# Patient Record
Sex: FEMALE | Race: BLACK OR AFRICAN AMERICAN | Employment: OTHER | URBAN - METROPOLITAN AREA
[De-identification: names, ages, dates, MRNs, and addresses within clinical notes are randomized per-mention and may not be internally consistent; named-entity substitution may affect disease eponyms.]

---

## 2020-08-12 ENCOUNTER — HOSPITAL ENCOUNTER (OUTPATIENT)
Dept: NUTRITION | Age: 65
Discharge: HOME | End: 2020-08-12
Payer: MEDICARE

## 2020-08-12 PROCEDURE — 97802 MEDICAL NUTRITION INDIV IN: CPT

## 2020-08-12 NOTE — PROGRESS NOTES
[address] [place] [address], [facility]  Phone: [phone] Fax: [phone]   Nutrition Assessment - Medical Nutrition Therapy   Outpatient Initial Evaluation         Patient Name: Carmita David : [id number]   Treatment Diagnosis: DM2   Referral Source: Jana Ivey MD [facility]): 2020     Gender: female Age: 71 y.o. Ht: 64 in Wt:  201 lb  kg   BMI: 35.4 BMR   Male  BMR Female [id number]     Past Medical History:  DM2, obesity, high BP, HLD     Pertinent Medications:   Metformin, Novolog, Levemir, BP and cholesterol meds (see paper chart)   Biochemical Data:   A1C 2020: 9.6     Assessment:    Pt in today with concerns over elevated A1C. In Feb of this year, her A1C was 6.5 and now it has gone up to 9.6. She has seen an RD in the past and states that she does remember her education. Pt does not work currently. Pt does the cooking for the household and she eats most meals at home. Pt tests her BS at home but she is currently working on getting a new machine. She walks 7 days per week most weeks for exercise. Food & Nutrition: Pt states that she eats a lot of junk food like potato chips and sweets. She has also been eating a lot of fried foods. She will drink 1 soda per week but she does drink other sweetened beverages such as tea and juice. No alcohol intake. B: cereal or yogurt with banana or other fruit or a muffin  L: Tuna or deli meat sandwich  D: chicken or liver with rice or potatoes and green beans or cabbage  Howard/snack after dinner: chips or something sweet     Estimated Needs   Kcals/ 1600  Protein: 100 Carbs: 180 Fat: 53   day  g/day  g/day  g/day        percent: 25  45  30               Nutrition Diagnosis PES: Alt nutrition related lab values r/t dx of DM2 aeb A1C of 9.6 (2020).         Nutrition Intervention &  Education: Discussed the Healthy Plate Method and appropriate portion sizes of different food groups. Explained the importance of combining carbohydrates with protein at meals and reviewed foods that contain each nutrient. Emphasized the importance of consistent meal time intervals throughout the day to improve metabolism. Explained calorie density and empty calories to assist pt with understanding portion sizes and limiting excess calories. Educated pt on the pathophysiology of Type II Diabetes and insulin resistance and the rationale for dietary modification and increased activity. Educated pt on all carbohydrates found in foods and encouraged no more than 45-60 g each time she eats. Encouraged pt not to go longer than 5 hours without eating but to space meals/snacks at least 3 hours apart. Handouts Provided: [x]  Carbohydrates  [x]  Protein  [x]  Non-starchy Vegatbles  [x]  Food Label  [x]  Meal and Snack Ideas  []  Food Journals [x]  Diabetes  []  Cholesterol  []  Sodium  []  Gen Nutr Guidelines  []  SBGM Guidelines  [x]  Others: snacks   Information Reviewed with: Pt   Readiness to Change Stage: []  Pre-contemplative    []  Contemplative  [x]  Preparation               []  Action                  []  Maintenance   Potential Barriers to Learning: []  Decline in memory    []  Language barrier   []  Other:  []  Emotional                  []  Limited mobility  []  Lack of motivation     [] Vision, hearing or cognitive impairment   Expected Compliance: Fair     Nutritional Goal - To promote lifestyle changes to result in:    []  Weight loss  [x]  Improved diabetic control  []  Decreased cholesterol levels  []  Decreased blood pressure  []  Weight maintenance []  Preventing any interactions associated with food allergies  []  Adequate weight gain toward goal weight  []  Other:        Patient Goals:   1. Limit CHO's to 45-60 g each time you eat  2. Have at least 1 serving of protein each time you eat (balanced meals)  3.  Aim to have a meal or snack within 1-2 hrs of waking - repeat every 3-5 hrs (eat 3-4 times/day)     Dietitian Signature: Roberto Alfredo MS, RD Date: 8/12/2020   Follow-up: 10/6/20 @ 11 Time: 2:15 PM

## 2020-10-06 ENCOUNTER — HOSPITAL ENCOUNTER (OUTPATIENT)
Dept: NUTRITION | Age: 65
Discharge: HOME | End: 2020-10-06
Payer: MEDICARE

## 2020-10-06 PROCEDURE — 97803 MED NUTRITION INDIV SUBSEQ: CPT

## 2020-10-06 NOTE — PROGRESS NOTES
.. NUTRITION - FOLLOW-UP TREATMENT NOTE  Patient Name: Kiersten Lew         Date: 10/6/2020  : 1955    YES/NO Patient  Verified  Diagnosis: Type 2 DM   In time:   11:00          Out time:  11:30   Total Treatment Time (min):   30 minutes     SUBJECTIVE/ASSESSMENT    Changes in medication or medical history? Any new allergies, surgeries or procedures? NO    If yes, update Summary List   The patient is here for a follow up visit. She is returning to her doctor in November and still does not have a glucometer. She is not checking her blood sugars, consequently, but she has made some changes to her diet since her last visit with the RD. She is also walking 5-6 days per week for 30 minutes. Current Wt: 197 Previous Wt: 201 Wt Change: - 4#     Achievement of Goals: 1. Limit CHO to 45-60 g each time you eat- in progress. Not met. I don't believe the patient is counting CHOs  2. Have at least 1 serving of protein each time you eat- balanced meals. Not met (The pt is not eating protein in the morning; she eats sugary cereals)  3. Aim to have meal and snack within 1-2 hrs of waking- eat every 3-5 hours- in progress      Patient Education:  [x]  Review current plan with patient   [x]  Other: Discussed healthy snacks to include 1 carb and 1 protein choice (keep junk food out of the house); discussed alternatives to sugary beverages. Add protein to breakfast meal and eliminate sugary cereals. Handouts/  Information Provided: [x]  Carbohydrates  []  Protein  []  Fiber  []  Serving Sizes  []  Fluids  [x]  General guidelines [x]  Diabetes  []  Cholesterol  []  Sodium  []  SBGM  []  Food Journals  [x]  Others: snack list     New Patient Goals: 1. No more than 2 servings of fruit per day (watch the serving sizes)  2. Cut out the sugary cereals in the morning  3. Continue with 8, 8 oz glasses of water per day.  Continue to work on eliminating all sugary beverages from diet (The patient says she only drinks 2 Pepsi's per week now; she used to drink them daily. She is still drinking Gatorade.)  4. Only add a snack if going longer than 4-5 hours between meals (snack should include 1 pro + 1 carb choice) Don't keep junk food in the house. PLAN    [x]  Continue on current plan []  Follow-up PRN   []  Discharge due to :    [x]  Next appt:  Will call for follow up in December      Dietitian: Anna Garcia RD, [facility]    Date: 10/6/2020 Time: 11:35 AM

## 2020-11-10 ENCOUNTER — HOSPITAL ENCOUNTER (OUTPATIENT)
Dept: NUTRITION | Age: 65
Discharge: HOME | End: 2020-11-10
Payer: MEDICARE

## 2020-11-10 PROCEDURE — 97803 MED NUTRITION INDIV SUBSEQ: CPT

## 2020-11-10 NOTE — PROGRESS NOTES
.. NUTRITION - FOLLOW-UP TREATMENT NOTE  Patient Name: Sly Benoit         Date: 11/10/2020  : 1955    YES/NO Patient  Verified  Diagnosis: Type 2 DM   In time:   11:00       Out time:   11:30   Total Treatment Time (min):  30 minutes     SUBJECTIVE/ASSESSMENT    Changes in medication or medical history? Any new allergies, surgeries or procedures? YES  If yes, update Summary List   The patient states, \"I've been doing pretty good. \" The patient states that she felt like she had a low blood sugar yesterday but did not check her blood sugar because she does not have a glucometer. She said she treated it by sucking on a piece of hard candy. She said she was running around yesterday and didn't eat while she was out. She is taking insulin: 38 units of insulin in the AM and 38 units of insulin in the PM. The patient states she takes Levemir. She did not mention meal time insulin. The patient says she is no longer eating sugary cereals in the morning. She is still eating fruit for snacks. She did not tell me how much fruit she is eating during the day. She says she is drinking more water (did not tell me how much). She is also walking 6-7 days per week. Current Wt: 197.8 Previous Wt: 197 Wt Change: unchanged     Achievement of Goals: 1. No more than 2 servings of fruit per day- in progress  2. Cut out sugary cereals in the morning- met, in progress  3. Continue with 8, 8 oz glasses of water per day (elimnate sugary drinks)- in progress  4. Add a snack if going longer than 4-5 hours without eating- not met, in progress       Patient Education:  [x]  Review current plan with patient   [x]  Other: Discussed what is a low blood sugar, signs and symptoms and how to treat. Encouraged the patient to get a medical ID if walking alone.    Handouts/  Information Provided: [x]  Carbohydrates  []  Protein  []  Fiber  []  Serving Sizes  []  Fluids  [x]  General guidelines [x]  Diabetes  []  Cholesterol  [] Sodium  []  SBGM  []  Food Journals  [x]  Others: hypoglycemia management guidelines (Sukh Del Valle), Medical ID handout      New Patient Goals: 1. If you have fruit as a snack, have some protein with it. Only consume 2 servings of fruit per day max. 2. Walk 30 minutes per day, at least 5 days per week  3. Follow the plate method for meals  4.  Eliminate all sugary drinks for diet and consume 64 oz sugar free beverages each day     PLAN    [x]  Continue on current plan []  Follow-up PRN   []  Discharge due to :    [x]  Next appt: December 10, 2020 @ 11 AM     Dietitian: Johanne Smith RD, [facility]    Date: 11/10/2020 Time: 1:28 PM

## 2020-12-10 ENCOUNTER — HOSPITAL ENCOUNTER (OUTPATIENT)
Dept: NUTRITION | Age: 65
Discharge: HOME | End: 2020-12-10
Payer: MEDICARE

## 2020-12-10 PROCEDURE — 97803 MED NUTRITION INDIV SUBSEQ: CPT

## 2020-12-10 NOTE — PROGRESS NOTES
.. NUTRITION - FOLLOW-UP TREATMENT NOTE  Patient Name: Jeri Marmolejo         Date: 12/10/2020  : 1955    YES/NO Patient  Verified  Diagnosis: type 2 DM   In time:   11:00           Out time:  11:30   Total Treatment Time (min):  30 minutes     SUBJECTIVE/ASSESSMENT    Changes in medication or medical history? Any new allergies, surgeries or procedures? YES   If yes, update Summary List   The patient is here today for a follow up visit. She has done extremely well following RD's advice and continues to lose weight at a steady rate. Diet history reveals that the patient is following the plate method guidelines most of the time. She admits to still be tempted by sweet drinks; she said she had a sweet tea yesterday. She also likes fruit punch. She is aware that she needs to avoid sweetened drinks to avoid blood sugar spikes. Since her last visit with REINALDO, she says she is no longer having hypoglycemia incidents. She continues to walk for at least 30 minutes, 5 days per week. The patient walks with a cane. Breakfast: 1/2 muffin, yogurt and fruit (apple or calros or grapes), no coffee, no juice  Lunch: peanut butter and honey sandwich OR tuna fish salad and crackers  Dinner: baked chicken, potatoes or rice, green beans or cabbage  Beverages: mostly water (she says she is drinking 6-8 cups per day)        Current Wt: 195# Previous Wt: 198# Wt Change: -3#     Achievement of Goals: 1. If you have fruit, have protein with it. Only consume 2 servings of whole fruit per day- Achieved, continue  2. Walk for 30 minutes per day, at least 5 times per week. Achieved, continue  3. Follow the plate method for meals- Achieved, continue  4. Eliminate all sugary drinks and consume 64 oz sugar free beverages each day. Not met but doing better, continue       Patient Education:  [x]  Review current plan with patient   []  Other: Reviewed the sugary beverages that the patient should avoid.  Made suggestion to switch to E[employer]i All Fruit spread for peanut butter sandwich instead of using honey   Handouts/  Information Provided: []  Carbohydrates  []  Protein  []  Fiber  []  Serving Sizes  []  Fluids  []  General guidelines []  Diabetes  []  Cholesterol  []  Sodium  []  SBGM  []  Food Journals  [x]  Others: Holiday Eating     New Patient Goals: Continue with previous goals set as the strategies presented are working! The patient will try to avoid drinking sugary beverages 100% of the time, and drink sugar free beverages instead. The patient will continue to follow plate method. The patient will continue to exercise for 30 minutes on most days.      PLAN    [x]  Continue on current plan []  Follow-up PRN   []  Discharge due to :    [x]  Next appt: January 21, 2021 @11 AM     Dietitian: Ronda Capps RD, [facility]    Date: 12/10/2020 Time: 1:16 PM

## 2021-01-21 ENCOUNTER — HOSPITAL ENCOUNTER (OUTPATIENT)
Dept: NUTRITION | Age: 66
Discharge: HOME | End: 2021-01-21
Payer: MEDICARE

## 2021-01-21 PROCEDURE — 97803 MED NUTRITION INDIV SUBSEQ: CPT

## 2021-01-21 NOTE — PROGRESS NOTES
.. NUTRITION - FOLLOW-UP TREATMENT NOTE  Patient Name: Corey Dunaway         Date: 2021  : 1955    YES/NO Patient  Verified  Diagnosis: Type 2 DM   In time:   11:00          Out time:  11:30   Total Treatment Time (min):  30 minutes     SUBJECTIVE/ASSESSMENT    Changes in medication or medical history? Any new allergies, surgeries or procedures? YES    If yes, update Summary List   The patient is here for a follow up visit today. She reports that she just lost her 80year old mother to covid on 2020. She has tried to continue to take care of herself this month but \"it has been hard. \" The patient is on a basal and bolus regimen for insulin. She says her basal insulin just changed. She takes 38 units of Levemir in the morning and 38 units at night. The patient takes 8 units Lispro before meals and she also checks her blood sugars before she takes her insulin. She says her blood sugars run higher in the morning (140); she says it's usually around 120 before lunch and 120-130 before dinner. She did not show me her meter today. She reports that she is not experiencing any hypoglycemia incidents at this time. The patient continues to walk 30 minutes on most days and she says she drinks 8 cups of water everyday. She provided RD with very basic diet history today. Breakfast: plain cheerios and carnation milk, some kind of fruit (apple, strawberries or tangerine)   Lunch: chicken salad with lettuce and tomato on whole wheat bread  Dinner: baked chicken, vegetables (cabbage, string beans), starch   She was pleased that she had not gained weight this month. She goes back to [facility] for her doctor's appointment sometime in February. Current Wt: 195# Previous Wt: 195# Wt Change: No change     Achievement of Goals: The patient continues to follow RD's advice. She has achieved all goals despite her grief this month.       Patient Education:  [x]  Review current plan with patient   [x]  Other: Reviewed blood glucose targets before and after meals. Provided information on what to do if blood sugars are higher than 180 after meals. Handouts/  Information Provided: []  Carbohydrates  []  Protein  []  Fiber  []  Serving Sizes  []  Fluids  []  General guidelines []  Diabetes  []  Cholesterol  []  Sodium  [x]  SBGM  []  Food Journals  []  Others:      New Patient Goals: Continue to follow guidelines set at previous visits. Will make adjustments as needed after patient's doctor's appointment in February. 1. Continue to follow plate method for meals: include lean protein and non-starchy vegetables; watch portions of carbs (strive for 30-45 g of carbohydrate at meals)  2. Continue to walk for 30 minutes on most days; don't go longer than 2-3 days without any physical activity   3.  Drink 8 cups sugar free beverages daily        PLAN    [x]  Continue on current plan []  Follow-up PRN   []  Discharge due to :    [x]  Next appt: March 16, 2021 @ 11 AM     Dietitian: Darius Nino RD, [facility]    Date: 1/21/2021 Time: 11:06 AM

## 2021-03-30 ENCOUNTER — HOSPITAL ENCOUNTER (OUTPATIENT)
Dept: NUTRITION | Age: 66
Discharge: HOME | End: 2021-03-30
Payer: MEDICARE

## 2021-03-30 PROCEDURE — 97803 MED NUTRITION INDIV SUBSEQ: CPT

## 2021-03-30 NOTE — PROGRESS NOTES
.. NUTRITION - FOLLOW-UP TREATMENT NOTE  Patient Name: Luis Miguel Pineda         Date: 3/30/2021  : 1955    YES/NO Patient  Verified  Diagnosis: Type 2 DM, obesity   In time:  1:30 PM         Out time:  2 PM   Total Treatment Time (min):  30 minutes     SUBJECTIVE/ASSESSMENT    Changes in medication or medical history? Any new allergies, surgeries or procedures? YES   If yes, update Summary List    The patient is here today for a follow up visit. She continues to follow RD recommendations with diet and exercise. She did gain a little weight this month; but the patient says she does not know why. She did not go to her doctor's appointment at [facility] in February because she said her insurance coverage changed. She is supposed to go to a new doctor this month but she cannot remember the doctor's name at this time. The patient says she checks her blood sugars twice a day (fasting- around 140-  and 2 hours after dinner- 120s). She did not bring her meter to this appointment. She is also continuing to take her insulin as directed. She walks \"everyday\" for 30 minutes. (The patient walks with a cane, however.)  Breakfast: (7 AM): oatmeal, piece of fruit (peach, orange, apple); sometimes bread or muffin  Lunch (noon): sandwich or chicken and vegetables   Dinner (5 PM): chicken, rice or corn, cabbage, string beans  Snacks (sometimes at night): popcorn or rice pudding   Beverages - water only    Current Wt: 196# Previous Wt: 195# Wt Change: +1     Achievement of Goals: The patient continues to follow RD's recommendations set at previous visits. Keep up the great work. Patient Education:  [x]  Review current plan with patient   [x]  Other: Asked patient to bring new doctor's name and lab report (if available) to next visit with RD in .    Handouts/  Information Provided: []  Carbohydrates  []  Protein  []  Fiber  []  Serving Sizes  []  Fluids  [x]  General guidelines []  Diabetes  []  Cholesterol  []  Sodium  [] SBGM  []  Food Journals  []  Others:      New Patient Goals: Encouraged the patient to continue to follow goals set at previous visits:  1. Eat on a schedule  2. Follow the plate method for meals- include non-starchy vegetables at lunch and dinner meals  3. Continue to walk for exercise at least 5 days per week for 30 minutes  4. Drink 64 oz water daily  5.  Check blood sugars as MD recommends and take insulin      PLAN    [x]  Continue on current plan []  Follow-up PRN   []  Discharge due to :    [x]  Next appt: June 8, 2021 @ 11 AM     Dietitian: Yessy Snell RD, [facility]    Date: 3/30/2021 Time: 1:55 PM

## 2021-06-29 ENCOUNTER — HOSPITAL ENCOUNTER (OUTPATIENT)
Dept: NUTRITION | Age: 66
Discharge: HOME | End: 2021-06-29
Payer: MEDICARE

## 2021-06-29 PROCEDURE — 97803 MED NUTRITION INDIV SUBSEQ: CPT

## 2021-06-29 NOTE — PROGRESS NOTES
.. NUTRITION - FOLLOW-UP TREATMENT NOTE  Patient Name: Pasquale Salgado         Date: 2021  : 1955    YES/NO Patient  Verified  Diagnosis: Type 2 DM, obesity   In time:   11:10           Out time:   11:40   Total Treatment Time (min):   30 minutes     SUBJECTIVE/ASSESSMENT    Changes in medication or medical history? Any new allergies, surgeries or procedures? YES/   If yes, update Summary List   The patient is here for a follow up visit today. She changed doctors since she was here last. Her last visit with REINALDO was in 2021. She is now seeing Finn Gaona NP at the [facility]. The patient said that while her insurance was changing, she did not have any insulin and consequently did not take it at all. She also admits that she did not follow any diet advice. She said she was eating sugary cereal, drinking sugary drinks and she was eating a lot of fast food. The patient shared her lab work with REINALDO. (21): A1C 10.3%, Glucose 370, LDL 63, HDL 44, , chol 144 The patient said that she is now taking her insulin as she gets a 3 month supply. She takes Detemir 38 units BID (AM and PM) and 8 units Novalog three times per day before meals. The patient says she does not have a Glucometer. She says she is going back to the doctor this week. The patient describes her diet as \"good days and bad days. \"   Breakfast: \"sweet\" cereal and fruit, not a lot of eggs lately, sometimes bread  Lunch: peanut butter and jelly sandwich  Dinner: chicken or steak, potatoes rice or pasta, veggies (string beans and cabbage)   Beverages: sweet tea, soda (tries to only have one a week), some water     The patient says she knows what to do; she just hasn't been doing it. The patient says she still walks everyday. (The patient walks with a cane). Current Wt: 198# Previous Wt: 196# Wt Change: +2#     Achievement of Goals:  The patient did not follow RD guidelines since she was here last.      Patient Education:  [x]  Review current plan with patient   []  Other: Reviewed all information with the patient. Stressed the importance of taking her insulin. She also  needs a meter. Handouts/  Information Provided: [x]  Carbohydrates  []  Protein  []  Fiber  [x]  Serving Sizes  []  Fluids  [x]  General guidelines [x]  Diabetes  []  Cholesterol  []  Sodium  [x]  SBGM  []  Food Journals  []  Others:      New Patient Goals: 1. Don't buy sweet cereals. Go back to eating a balance of carbs and protein at breakfast.   2. Do not drink sweet drinks (see list of sugar free drink options)  3. Follow plate method for meals and eat on a schedule (eating meals every 4-5 hours)  4. Consistently take insulin as directed  5. Obtain a glucometer (ask NP at health clinic when she goes back for a follow up). Check blood sugars before taking insulin.      PLAN    [x]  Continue on current plan []  Follow-up PRN   []  Discharge due to :    [x]  Next appt: September 14, 2021 @ 11 AM     Dietitian: Yves Cramer RD, [facility]    Date: 6/29/2021 Time: 1:30 PM

## 2021-07-01 ENCOUNTER — TRANSCRIBE ORDER (OUTPATIENT)
Dept: SCHEDULING | Age: 66
End: 2021-07-01

## 2021-07-01 DIAGNOSIS — Z12.31 VISIT FOR SCREENING MAMMOGRAM: Primary | ICD-10-CM

## 2021-07-12 ENCOUNTER — HOSPITAL ENCOUNTER (OUTPATIENT)
Dept: WOMENS IMAGING | Age: 66
Discharge: HOME | End: 2021-07-12
Attending: NURSE PRACTITIONER
Payer: MEDICARE

## 2021-07-12 DIAGNOSIS — Z12.31 VISIT FOR SCREENING MAMMOGRAM: ICD-10-CM

## 2021-07-12 PROCEDURE — 77067 SCR MAMMO BI INCL CAD: CPT

## 2021-09-14 ENCOUNTER — HOSPITAL ENCOUNTER (OUTPATIENT)
Dept: NUTRITION | Age: 66
Discharge: HOME | End: 2021-09-14
Payer: MEDICARE

## 2021-09-14 PROCEDURE — 97803 MED NUTRITION INDIV SUBSEQ: CPT

## 2021-09-14 NOTE — PROGRESS NOTES
.. NUTRITION - FOLLOW-UP TREATMENT NOTE  Patient Name: Cait Castillo         Date: 2021  : 1955    YES/NO Patient  Verified  Diagnosis: Type 2 DM, obesity, HTN   In time:   11:00            Out time:   11:30   Total Treatment Time (min):   30 minutes     SUBJECTIVE/ASSESSMENT    Changes in medication or medical history? Any new allergies, surgeries or procedures? YES    If yes, update Summary List   The patient is here for a follow up visit. (This is her last visit this year as part of her Medicare benefits for Medical Nutrition Therapy). The patient says she is taking her insulin now but she says she does not have a glucometer. \"I'm waiting on insurance to approve it. \" She says she does not check it. A1C is somewhat improved. (21): A1C 9.7% (down from 10.3% in April)  I think she takes Detemir BID (38 units) and Novalog 8 units with meals. The patient says she is having a hard time following the diet but she is trying to follow RD advice. The patient says she is still feeling sad about the loss of her mother to covid in 2020. The patient also told me that her blood pressure is not in good control right now. She knows she needs to eat less salt. She's trying to stay away from sugary drinks. \"I bought the [employer] you told me to buy. \" She said she also bought plain Cheerios and Corn Chex. (She was eating the sugary cereals.) The patient says she goes to see Loreto Hook NP. She switched doctors this year. She also mentioned Dixon Funez MD. It is hard to get a straight answer on where she goes to get care. Breakfast: Cheerios or Chex, sometimes hard boiled egg  Lunch: egg sandwich or PB and J on crackers OR salad with carrots lettuce and cheese  Dinner: chicken or beef and veggies  Beverages: water, crystal light     This is the diet history she relayed to RD today, but I believe she does not always follow guidelines.  She says she still walks daily but she has limited mobility (she walks with a cane.)       Current Wt: 200 Previous Wt: 198 Wt Change: +2#     Achievement of Goals: 1. Don't buy sweet cereals. Go back to eating balance of carbs and protein at breakfast. The patient says she is doing this. Continue  2. Do not drink sweet drinks. The patient says she is doing this now. In progress, continue  3. Follow plate method for meals and eat on a schedule. Sometimes. In progress, continue  4. Consistently take insulin as directed. Doing this now. In progress, continue  5. Obtain a glucometer. Check blood sugar before taking insulin. Not met, continue     Patient Education:  [x]  Review current plan with patient   [x]  Other: Discussed low sodium diet guidelines, ways of cooking to decrease salt content. Stressed importance of using a glucometer. Consider using Freestyle Panfilo if insurance will cover it. Handouts/  Information Provided: []  Carbohydrates  []  Protein  []  Fiber  []  Serving Sizes  []  Fluids  []  General guidelines []  Diabetes  []  Cholesterol  [x]  Sodium  []  SBGM  []  Food Journals  [x]  Others: Provided the patient with Glucerna Hunger Smart samples and coupons, [facility] pamphlet      New Patient Goals: 1. Three balanced meals per day, spacing meals every 4-5 hours  2. No sugary drinks at all  3. Keep moving daily. Goal for exercise is 30 minutes on most days  4. The patient needs to make getting a glucometer a priority. Check blood sugar before meals to ensure you are taking the right amount of insulin. Talk to doctor about obtaining a freestyle panfilo. PLAN    [x]  Continue on current plan []  Follow-up PRN   []  Discharge due to :    [x]  Next appt: The patient has used all of her Medicare benefits for 2021. I asked her to call in December for an appointment in January. The patient is eligible for 2 hours of Medical Nutrition Therapy per year as part of her Medicare benefits.  We will need another referral sent over in January 2022 to activate her benefits. Thank you.       Dietitian: Neo Arita RD, [facility]    Date: 9/14/2021 Time: 11:24 AM

## 2022-01-13 ENCOUNTER — HOSPITAL ENCOUNTER (OUTPATIENT)
Dept: NUTRITION | Age: 67
Discharge: HOME | End: 2022-01-13
Payer: MEDICARE

## 2022-01-13 PROCEDURE — 97803 MED NUTRITION INDIV SUBSEQ: CPT

## 2022-01-13 NOTE — PROGRESS NOTES
.. 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 Any new allergies, surgeries or procedures? YES   If yes, update Summary List   The patient is here for a follow up appointment today. Most recent A1C: (10/5/21): 9.6% (9.7% on 21- relatively unchanged). The patient said she will return to Pattie Patel NP next week. She said she just went to her pediatrist and her feet \"are good. \" The patient knows the information regarding taking care of her diabetes (she has been coming to see me since ), she just does not always follow advice. She provided a dietary history of what she ate yesterday. Breakfast (7 AM): Chex cereal with 1% milk, fruit (apple, peach or peach) sometime 1/2 blueberry muffin  Lunch (11:30-12): Tuna fish on salad greens, with raisin, parkinson bits, egg and dressing  Snack: granola bar, crackers (sometimes with PB), OR yogurt  Dinner: chicken, string beans and 1/2 cup rice  Beverages: \"I am drinking my water. \" The patient also admits that she has been drinking sweet tea. The patient says she still craves sweets. She ate some dark chocolate cashews last night. She said she bought the ones that are portion controlled (in small packages) so she wouldn't eat too many. The patient was using her walker today. She says she still tries to walk in the morning for 30 minutes. Her goal is to walk a little bit in the afternoon too but she hasn't done it yet. Her insulin dosage is unchanged. Detemir is what is ordered- she says she takes Levemir. 38 units in AM and 38 units in PM and Humalog (8 units with meals)   She did not mention to me if she is checking her blood sugars.  She said she didn't have a meter at her last visit. Current Wt: 200 Previous Wt: 200 Wt Change: unchanged     Achievement of Goals: 1. Three balanced meals per day, spacing meals every 4-5 hours. Met, continue  2. No sugary drinks at all. Not met, continue  3. Daily exercise, 30 minutes per day. Partially met. I don't think she walks everyday. 4. Obtain glucometer. Did not mention at this visit. Probably not achieved. Patient Education:  [x]  Review current plan with patient   []  Other:    Handouts/  Information Provided: []  Carbohydrates  []  Protein  []  Fiber  []  Serving Sizes  []  Fluids  []  General guidelines []  Diabetes  []  Cholesterol  []  Sodium  []  SBGM  []  Food Journals  []  Others:      New Patient Goals: Simplifying goals as the patient is not consistent despite knowing the information:  1. If you eat a snack include some protein with it. Don't eat crackers alone. (Try [place] yogurt or apple with PB or crackers with cheese, turkey or PB. No salami.)  2. Drink only SF beverages. No sweet tea. Crystal Light, water, 1 diet soda per day is fine, tea- do not add sugar or honey. Can use Splenda or sweet n low.   3. Try to walk a little bit in the afternoon (10-15 minutes or whatever you can tolerate)     PLAN    [x]  Continue on current plan []  Follow-up PRN   []  Discharge due to :    [x]  Next appt: April 5, 2022 @ 11 AM     Dietitian: Gaby Alonso RD, [facility]    Date: 1/13/2022 Time: 11:45 AM

## 2022-04-05 ENCOUNTER — HOSPITAL ENCOUNTER (OUTPATIENT)
Dept: NUTRITION | Age: 67
Discharge: HOME | End: 2022-04-05
Payer: MEDICARE

## 2022-04-05 PROCEDURE — 97803 MED NUTRITION INDIV SUBSEQ: CPT

## 2022-04-05 NOTE — PROGRESS NOTES
.. NUTRITION - FOLLOW-UP TREATMENT NOTE  Patient Name: Vi Delvalle         Date: 2022  : 1955    YES/NO Patient  Verified  Diagnosis: Type 1.5 diabetes, managed as Type 2, obesity   In time:   11:00             Out time:   11:30   Total Treatment Time (min):  30 minutes     SUBJECTIVE/ASSESSMENT    Changes in medication or medical history? Any new allergies, surgeries or procedures? YES    If yes, update Summary List   The patient is here for a follow up visit today. She is now being seen by Donna Shepard NP (endocrinology). She says she has been there twice: in January and end of 2022. The patient says she has had some medication changes recently, and she is starting to feel better. She now takes Levemir 28 units AM and PM. She takes 14 units of Humalog with meals. The patient said her NP would like her to take Jayme Sack but this has not happened yet. The patient reports that she is doing better with her diet and she is now walking daily (sometimes twice a day). She says she walks every morning and she joined a walking group and they walk in the evenings Monday-Saturday for 30-40 minutes. The patient says she does have a glucometer now and she says she is checking before meals (Patient reports blood sugars in the 200s which she says is better). Breakfast: oatmeal and fruit (grapes, apple or an orange)  Lunch: sandwich (ham, chicken salad or tuna); yesterday she says she had a salad with tomatoes, cucumbers meat and [place] dressing  Dinner: chicken and string beans, rice or potatoes  Beverages: diet pepsi (sometimes she will have a regular one if she is out of the diet), crystal light, water. The patient said she stopped drinking sweet tea. Snacks: popcorn, peanut butter crackers, sometimes M and Ms  Barriers:  The patient does not use measuring cups to measure her carb choices (no portion control) , she prefers sugary drinks, she likes candy, she is not consistently following dietary advice, she walks with a walker so she she has mobility and safety concerns, she lives alone       Current Wt: 200 Previous Wt: 200 Wt Change: unchanged     Achievement of Goals: 1. If you eat a snack, eat protein with it. Achieved sometimes, in progress, continue  2. Drink only sugar free beverages. Doing better with this (gave up the sweet tea) but she still slips up sometimes. In progress, continue  3. Try to walk some in the afternoon (10-15 minutes) Met, continue     Patient Education:  [x]  Review current plan with patient   [x]  Other: Reviewed guidelines again. Provided support and encouragement    Handouts/  Information Provided: []  Carbohydrates  []  Protein  []  Fiber  []  Serving Sizes  []  Fluids  []  General guidelines []  Diabetes  []  Cholesterol  []  Sodium  []  SBGM  []  Food Journals  [x]  Others: [employer]: [employer] booklet     New Patient Goals: 1. 3 balanced meals per day, check blood sugar before meals, take insulin as directed  2. Use measuring cups for rice and potatoes. Measure out one cup and that is it for the meal. If not going to use measuring cups, up a smaller plate and only 1/4 of the plate should be carbs. 3. Please keep candy out of the house so you are not tempted. If you eat M and Ms, eat after a meal (not as a snack). Only eat small amount and adjust insulin accordingly  4. Continue with only sugar free beverages (buy diet sodas, crystal light, water)  5.  Keep up the good work with walking twice per day (preferrably after meals)      PLAN    [x]  Continue on current plan []  Follow-up PRN   []  Discharge due to :    [x]  Next appt: Please return for F/U in July or August with Edmund Valdovinos RD      Dietitian: Laron Obregon RD, [facility]    Date: 4/5/2022 Time: 11:58 AM

## 2022-07-06 ENCOUNTER — APPOINTMENT (OUTPATIENT)
Dept: NUTRITION | Age: 67
End: 2022-07-06

## 2022-08-23 ENCOUNTER — TRANSCRIBE ORDER (OUTPATIENT)
Dept: SCHEDULING | Age: 67
End: 2022-08-23

## 2022-08-23 DIAGNOSIS — Z12.31 VISIT FOR SCREENING MAMMOGRAM: Primary | ICD-10-CM

## 2022-08-29 ENCOUNTER — HOSPITAL ENCOUNTER (OUTPATIENT)
Dept: WOMENS IMAGING | Age: 67
Discharge: HOME | End: 2022-08-29
Attending: GENERAL PRACTICE
Payer: MEDICARE

## 2022-08-29 DIAGNOSIS — Z12.31 VISIT FOR SCREENING MAMMOGRAM: ICD-10-CM

## 2022-08-29 PROCEDURE — 77063 BREAST TOMOSYNTHESIS BI: CPT

## 2022-09-20 ENCOUNTER — APPOINTMENT (OUTPATIENT)
Dept: NUTRITION | Age: 67
End: 2022-09-20

## 2022-10-04 ENCOUNTER — HOSPITAL ENCOUNTER (OUTPATIENT)
Dept: NUTRITION | Age: 67
Discharge: HOME | End: 2022-10-04
Payer: MEDICARE

## 2022-10-04 PROCEDURE — 97803 MED NUTRITION INDIV SUBSEQ: CPT

## 2022-10-04 NOTE — PROGRESS NOTES
NUTRITION - FOLLOW-UP TREATMENT NOTE  Patient Name: Shyam Vanegas         Date: 10/04/2022  : 1955    YES/NO Patient  Verified  Diagnosis: Type 2 DM, Obesity      Total Treatment Time (min):   30     SUBJECTIVE/ASSESSMENT:  Patient is doing well. She has managed to maintain weight over the last 6 months. She hopes to lose weight. RD provided input to help her decreased her weight. Changes in medication or medical history? Any new allergies, surgeries or procedures? YES/NO    If yes, update Summary List   None reported         Current Wt: 201# Previous Wt: 200# Wt Change: 1#     Achievement of Goals:  Patient has maintained weight. Patient Education:  [x]  Review current plan with patient   []  Other:    Handouts/  Information Provided: []  Carbohydrates  []  Protein  []  Fiber  []  Serving Sizes  []  Fluids  []  General guidelines []  Diabetes  []  Cholesterol  []  Sodium  []  SBGM  []  Food Journals  []  Others:      New Patient Goals:  Patient to try new products that were suggested.      PLAN    [x]  Continue on current plan []  Follow-up PRN   []  Discharge due to :    [x]  Next appt: 2022  at 10:30 am     Dietitian: Jackson Rivas RD    Date: 10/04/2022

## 2023-07-24 ENCOUNTER — TRANSCRIBE ORDERS (OUTPATIENT)
Facility: HOSPITAL | Age: 68
End: 2023-07-24

## 2023-07-24 DIAGNOSIS — Z12.31 VISIT FOR SCREENING MAMMOGRAM: Primary | ICD-10-CM

## 2023-09-11 ENCOUNTER — HOSPITAL ENCOUNTER (OUTPATIENT)
Dept: WOMENS IMAGING | Facility: HOSPITAL | Age: 68
Discharge: HOME | End: 2023-09-14
Payer: MEDICARE

## 2023-09-11 DIAGNOSIS — Z12.31 VISIT FOR SCREENING MAMMOGRAM: ICD-10-CM

## 2023-09-11 PROCEDURE — 77063 BREAST TOMOSYNTHESIS BI: CPT

## 2024-09-16 ENCOUNTER — HOSPITAL ENCOUNTER (OUTPATIENT)
Dept: WOMENS IMAGING | Facility: HOSPITAL | Age: 69
Discharge: HOME | End: 2024-09-19
Payer: MEDICARE

## 2024-09-16 VITALS — HEIGHT: 63 IN | BODY MASS INDEX: 36.14 KG/M2 | WEIGHT: 204 LBS

## 2024-09-16 DIAGNOSIS — Z12.31 SCREENING MAMMOGRAM FOR BREAST CANCER: ICD-10-CM

## 2024-09-16 PROCEDURE — 77063 BREAST TOMOSYNTHESIS BI: CPT

## 2025-03-17 ENCOUNTER — HOSPITAL ENCOUNTER (OUTPATIENT)
Facility: HOSPITAL | Age: 70
Setting detail: RECURRING SERIES
Discharge: HOME | End: 2025-03-20
Payer: MEDICARE

## 2025-03-17 PROCEDURE — 97110 THERAPEUTIC EXERCISES: CPT

## 2025-03-17 PROCEDURE — 97161 PT EVAL LOW COMPLEX 20 MIN: CPT

## 2025-03-17 NOTE — PROGRESS NOTES
PT DAILY TREATMENT NOTE/LUMBAR EVAL     Patient Name: Carol Liao    Date: 3/17/2025    : 1955  Insurance: Payor: [facility] MEDICARE / Plan: [facility] MEDICARE ADVANTAGE / Product Type: *No Product type* /      Patient  verified yes     Visit #   Current / Total 1 36   Time   In / Out 200 245   Pain   In / Out 6 6   Subjective Functional Status/Changes: See POC     Treatment Area: Low back pain, unspecified    Physical Therapy Evaluation - Lumbar Spine     See POC    37 min [x]Eval        - untimed        Therapeutic Procedures:  Tx Min Billable or 1:1 Min (if diff from Tx Min) Procedure, Rationale, Specifics   8  88942 Therapeutic Exercise (timed):  increase ROM, strength, coordination, balance, and proprioception to improve patient's ability to progress to PLOF and address remaining functional goals. (see flow sheet as applicable)     Details if applicable:     Total  8 Total Reminder: MC/BC bill using total billable min of TIMED therapeutic procedures (example: do not include dry needle or estim unattended, both untimed codes, in totals to left)     [x]  Patient Education billed concurrently with other procedures     Other Objective/Functional Measures: See POC    Pain Level (0-10 scale) post treatment: 6    ASSESSMENT/Changes in Function: See POC    Patient will continue to benefit from skilled PT services to modify and progress therapeutic interventions, analyze and address functional mobility deficits, analyze and address ROM deficits, analyze and address strength deficits, analyze and address soft tissue restrictions, analyze and cue for proper movement patterns, analyze and modify for postural abnormalities, and instruct in home and community integration to address functional deficits and attain remaining goals.    [x]  See Plan of Care - for goals and assessment     PLAN  []  Upgrade activities as tolerated     []  Continue plan of care  []  Update interventions per flow sheet       []  Other:_  
AROM                 Strength (1-5)      Left Right Left Right   Hip Flexion (0-120)  50 60 4 5     Extension (0-20)  LIMITED  LIMITED 2 2    IR (0-45) 10 18 5 5    ER (0-45) 18 14 5 5     Abduction (0-45) 18   16 2 2   Knee Flexion (0-135) 80 90 5 5     Extension (0) 8 6 4 4   Shoulder Flexion 100 92 4- 4    Abduction 135 142 4- 4-    IR NT NT 4 4    ER NT NT 2 2    Horizontal Abduction NT NT 2 2     Neuro Screen  Dermatome: WNL         Deficits: Ely's:   [x] R    [x] L    [x] +    [] -     Vernon:  [x] R    [x] L    [x] +    [] -     Hamstrings 90/90: [x] R    [x] L    [x] +    [] -        5 Time Sit To Stand: 22\"  Left SLS: UNABLE  Right SLS: UNABLE    Evaluation Complexity:  History:  HIGH Complexity :3+ comorbidities / personal factors will impact the outcome/ POC ; Examination:  MEDIUM Complexity : 3 Standardized tests and measures addressin body structure, function, activity limitation and / or participation in recreation  ;Presentation:  LOW Complexity : Stable, uncomplicated  ;Clinical Decision Making: Oswestry Disability Index (ALEKSANDRA) for Low Back Pain = 50 % ; (> 41% Severe Disability) = HIGH Complexity  Overall Complexity Rating: LOW   Problem List: pain affecting function, decrease ROM, decrease strength, impaired gait/balance, decrease ADL/functional abilities, decrease activity tolerance, decrease flexibility/joint mobility, and decrease transfer abilities    Treatment Plan may include any combination of the followin Therapeutic Exercise, 28723 Neuromuscular Re-Education, 42025 Manual Therapy, 03589 Therapeutic Activity, 08587 Self Care/Home Management, 37961 Electrical Stim unattended /  (MCR), 97371 Electrical Stim attended, 44794 Gait Training, 70076 Ultrasound, and 24368 Mechanical Traction  Patient / Family readiness to learn indicated by: asking questions, trying to perform skills, and interest  Persons(s) to be included in education: patient (P)  Barriers to Learning/Limitations:

## 2025-03-31 ENCOUNTER — HOSPITAL ENCOUNTER (OUTPATIENT)
Facility: HOSPITAL | Age: 70
Setting detail: RECURRING SERIES
Discharge: HOME | End: 2025-04-03
Payer: MEDICARE

## 2025-03-31 PROCEDURE — 97112 NEUROMUSCULAR REEDUCATION: CPT

## 2025-03-31 PROCEDURE — 97110 THERAPEUTIC EXERCISES: CPT

## 2025-03-31 PROCEDURE — 97530 THERAPEUTIC ACTIVITIES: CPT

## 2025-03-31 NOTE — PROGRESS NOTES
PHYSICAL / OCCUPATIONAL THERAPY - DAILY TREATMENT NOTE (updated )    Patient Name: Carol Liao    Date: 3/31/2025    : 1955  Insurance: Payor: [facility] MEDICARE / Plan: [facility] MEDICARE ADVANTAGE / Product Type: *No Product type* /      Patient  verified Yes     Visit #   Current / Total 2 36   Time   In / Out 1:25 2:13   Pain   In / Out 5 2   Subjective Functional Status/Changes: Pt reports 5/10 pain, reports compliance with HEP.     Next PN/ RC due PN 2025, RC 2025  Auth due 12  visits, 3/17/2025-10/9/2025    TREATMENT AREA =  Low back pain, unspecified  Other low back pain    OBJECTIVE    Modalities Rationale:     decrease pain to improve patient's ability to progress to PLOF and address remaining functional goals.     min [] Estim Unattended, type/location:                                      []  w/ice    []  w/heat    min [] Estim Attended, type/location:                                     []  w/US     []  w/ice    []  w/heat    []  TENS insruct      min []  Mechanical Traction: type/lbs                   []  pro   []  sup   []  int   []  cont    []  before manual    []  after manual    min []  Ultrasound, settings/location:     10 min  unbill []  Ice     [x]  Heat    location/position: L/S, supine with wedge under head and B LE    min []  Paraffin,  details:     min []  Vasopneumatic Device, press/temp:     min []  Whirlpool / Fluido:    If using vaso (only need to measure limb vaso being performed on)      pre-treatment girth :       post-treatment girth :       measured at (landmark location) :      min []  Other:    Skin assessment post-treatment:   Intact      Therapeutic Procedures:    Tx Min Billable or 1:1 Min (if diff from Tx Min) Procedure, Rationale, Specifics   15  20391 Therapeutic Exercise (timed):  increase ROM, strength, coordination, balance, and proprioception to improve patient's ability to progress to PLOF and address remaining functional goals. (see flow sheet as

## 2025-04-01 ENCOUNTER — HOSPITAL ENCOUNTER (OUTPATIENT)
Facility: HOSPITAL | Age: 70
Setting detail: RECURRING SERIES
Discharge: HOME | End: 2025-04-04
Payer: MEDICARE

## 2025-04-01 PROCEDURE — 97530 THERAPEUTIC ACTIVITIES: CPT

## 2025-04-01 PROCEDURE — 97110 THERAPEUTIC EXERCISES: CPT

## 2025-04-01 PROCEDURE — 97112 NEUROMUSCULAR REEDUCATION: CPT

## 2025-04-01 NOTE — PROGRESS NOTES
PHYSICAL / OCCUPATIONAL THERAPY - DAILY TREATMENT NOTE (updated )    Patient Name: Carol Liao    Date: 2025    : 1955  Insurance: Payor: [facility] MEDICARE / Plan: [facility] MEDICARE ADVANTAGE / Product Type: *No Product type* /      Patient  verified Yes     Visit #   Current / Total 3 36   Time   In / Out 10:40 11:20    Pain   In / Out No back pain, 510 B knees  4/10    Subjective Functional Status/Changes: \"I will try to stand longer\"      Next PN/ RC due PN 2025, RC 2025  Auth due 12  visits, 3/17/2025-10/9/2025    TREATMENT AREA =  Low back pain, unspecified  Other low back pain    OBJECTIVE  Therapeutic Procedures:    Tx Min Billable or 1:1 Min (if diff from Tx Min) Procedure, Rationale, Specifics   10  25882 Therapeutic Exercise (timed):  increase ROM, strength, coordination, balance, and proprioception to improve patient's ability to progress to PLOF and address remaining functional goals. (see flow sheet as applicable)     Details if applicable:     15  00898 Neuromuscular Re-Education (timed):  improve balance, coordination, kinesthetic sense, posture, core stability and proprioception to improve patient's ability to develop conscious control of individual muscles and awareness of position of extremities in order to progress to PLOF and address remaining functional goals. (see flow sheet as applicable)     Details if applicable:     15  88882 Therapeutic Activity (timed):  use of dynamic activities replicating functional movements to increase ROM, strength, coordination, balance, and proprioception in order to improve patient's ability to progress to PLOF and address remaining functional goals.  (see flow sheet as applicable)     Details if applicable:    40  [facility] Totals Reminder: bill using total billable min of TIMED therapeutic procedures (example: do not include dry needle or estim unattended, both untimed codes, in totals to left)  8-22 min = 1 unit; 23-37 min = 2 units;

## 2025-04-07 ENCOUNTER — HOSPITAL ENCOUNTER (OUTPATIENT)
Facility: HOSPITAL | Age: 70
Setting detail: RECURRING SERIES
Discharge: HOME | End: 2025-04-10
Payer: MEDICARE

## 2025-04-07 PROCEDURE — 97112 NEUROMUSCULAR REEDUCATION: CPT

## 2025-04-07 PROCEDURE — 97530 THERAPEUTIC ACTIVITIES: CPT

## 2025-04-07 PROCEDURE — 97110 THERAPEUTIC EXERCISES: CPT

## 2025-04-07 NOTE — PROGRESS NOTES
PHYSICAL / OCCUPATIONAL THERAPY - DAILY TREATMENT NOTE (updated )    Patient Name: Carol Liao    Date: 2025    : 1955  Insurance: Payor: [facility] MEDICARE / Plan: [facility] MEDICARE ADVANTAGE / Product Type: *No Product type* /      Patient  verified Yes     Visit #   Current / Total 4 36   Time   In / Out 115 2:05   Pain   In / Out 6-7 2   Subjective Functional Status/Changes: Didn't get out this weekend because of the pollen. But has been doing her HEP daily     Next PN/ RC due PN 2025, RC 2025  Auth due 12  visits, 3/17/2025-10/9/2025    TREATMENT AREA =  Low back pain, unspecified  Other low back pain    OBJECTIVE  Therapeutic Procedures:    Tx Min Billable or 1:1 Min (if diff from Tx Min) Procedure, Rationale, Specifics   10 20 15165 Therapeutic Exercise (timed):  increase ROM, strength, coordination, balance, and proprioception to improve patient's ability to progress to PLOF and address remaining functional goals. (see flow sheet as applicable)     Details if applicable:   end of session NC   15  10204 Neuromuscular Re-Education (timed):  improve balance, coordination, kinesthetic sense, posture, core stability and proprioception to improve patient's ability to develop conscious control of individual muscles and awareness of position of extremities in order to progress to PLOF and address remaining functional goals. (see flow sheet as applicable)     Details if applicable:  new tasks as noted on sheet for balance and eccentric control   15  06739 Therapeutic Activity (timed):  use of dynamic activities replicating functional movements to increase ROM, strength, coordination, balance, and proprioception in order to improve patient's ability to progress to PLOF and address remaining functional goals.  (see flow sheet as applicable)     Details if applicable:    40 50 MC BC Totals Reminder: bill using total billable min of TIMED therapeutic procedures (example: do not include dry

## 2025-04-08 ENCOUNTER — HOSPITAL ENCOUNTER (OUTPATIENT)
Facility: HOSPITAL | Age: 70
Setting detail: RECURRING SERIES
Discharge: HOME | End: 2025-04-11
Payer: MEDICARE

## 2025-04-08 PROCEDURE — 97110 THERAPEUTIC EXERCISES: CPT

## 2025-04-08 NOTE — PROGRESS NOTES
PHYSICAL / OCCUPATIONAL THERAPY - DAILY TREATMENT NOTE (updated )    Patient Name: Carol Liao    Date: 2025    : 1955  Insurance: Payor: [facility] MEDICARE / Plan: [facility] MEDICARE ADVANTAGE / Product Type: *No Product type* /      Patient  verified YES    Visit #   Current / Total 5 36   Time   In / Out 1230 138   Pain   In / Out 1 0   Subjective Functional Status/Changes: Pt originally reported 8/10 and that she was feeling good. After education on pain scale she stated her pain level was a 1/10.      TREATMENT AREA =  Low back pain, unspecified  Other low back pain    Next PN/ RC due PN 2025, RC 2025  Auth due 12  visits, 3/17/2025-10/9/2025    OBJECTIVE    Heat (UNBILLED):  location/position: L/S, HOB Elevated and BLE elevated .    Min Rationale   10 decrease pain and increase tissue extensibility to improve patient's ability to progress to PLOF and address remaining functional goals.     Skin assessment post-treatment:   Intact     Therapeutic Procedures:  Tx Min Billable or 1:1 Min (if diff from Tx Min) Procedure, Rationale, Specifics   58 [phone] 12024 Therapeutic Exercise (timed):  increase ROM, strength, coordination, balance, and proprioception to improve patient's ability to progress to PLOF and address remaining functional goals. (see flow sheet as applicable)     Details if applicable:    See flowsheet     58 38 [facility] Totals Reminder: bill using total billable min of TIMED therapeutic procedures (example: do not include dry needle or estim unattended, both untimed codes, in totals to left)  8-22 min = 1 unit; 23-37 min = 2 units; 38-52 min = 3 units; 53-67 min = 4 units; 68-82 min = 5 units   Total Total     Charge Capture    [x]  Patient Education billed concurrently with other procedures   [x] Review HEP    [] Progressed/Changed HEP, detail:    [] Other detail:       Objective Information/Functional Measures/Assessment    Pt tolerated session well and demonstrated mild

## 2025-04-14 ENCOUNTER — HOSPITAL ENCOUNTER (OUTPATIENT)
Facility: HOSPITAL | Age: 70
Setting detail: RECURRING SERIES
Discharge: HOME | End: 2025-04-17
Payer: MEDICARE

## 2025-04-14 PROCEDURE — 97110 THERAPEUTIC EXERCISES: CPT

## 2025-04-14 NOTE — PROGRESS NOTES
PHYSICAL / OCCUPATIONAL THERAPY - DAILY TREATMENT NOTE (updated )    Patient Name: Carol Liao    Date: 2025    : 1955  Insurance: Payor: [facility] MEDICARE / Plan: [facility] MEDICARE ADVANTAGE / Product Type: *No Product type* /      Patient  verified YES    Visit #   Current / Total 6 36   Time   In / Out 1041 1139   Pain   In / Out 4 0   Subjective Functional Status/Changes: Pt reports no changes from last session     TREATMENT AREA =  Low back pain, unspecified  Other low back pain    Next PN/ RC due PN 2025, RC 2025  Auth due 12  visits, 3/17/2025-10/9/2025       OBJECTIVE    Heat (UNBILLED):  location/position: L/S, HOB Elevated and BLE elevated .    Min Rationale   10 decrease pain and increase tissue extensibility to improve patient's ability to progress to PLOF and address remaining functional goals.     Skin assessment post-treatment:   Intact     Therapeutic Procedures:  Tx Min Billable or 1:1 Min (if diff from Tx Min) Procedure, Rationale, Specifics   48 90 [id number] Therapeutic Exercise (timed):  increase ROM, strength, coordination, balance, and proprioception to improve patient's ability to progress to PLOF and address remaining functional goals. (see flow sheet as applicable)     Details if applicable:    See flowsheet     48 40 [facility] Totals Reminder: bill using total billable min of TIMED therapeutic procedures (example: do not include dry needle or estim unattended, both untimed codes, in totals to left)  8-22 min = 1 unit; 23-37 min = 2 units; 38-52 min = 3 units; 53-67 min = 4 units; 68-82 min = 5 units   Total Total     Charge Capture    [x]  Patient Education billed concurrently with other procedures   [x] Review HEP    [] Progressed/Changed HEP, detail:    [] Other detail:       Objective Information/Functional Measures/Assessment    Pt tolerated session well and demonstrated min apprehension during session. Required cuing throughout session. Completed more standing

## 2025-04-15 ENCOUNTER — HOSPITAL ENCOUNTER (OUTPATIENT)
Facility: HOSPITAL | Age: 70
Setting detail: RECURRING SERIES
Discharge: HOME | End: 2025-04-18
Payer: MEDICARE

## 2025-04-15 PROCEDURE — 97530 THERAPEUTIC ACTIVITIES: CPT

## 2025-04-15 PROCEDURE — 97112 NEUROMUSCULAR REEDUCATION: CPT

## 2025-04-15 PROCEDURE — 97110 THERAPEUTIC EXERCISES: CPT

## 2025-04-15 NOTE — PROGRESS NOTES
lifting. MET  Status at IE 92 degrees  Current: 148 degs  3.    Pt will demonstrate left knee flexion AROM to at least 90 degrees to improve gait training.progressing  Status at IE 80 degrees  Current: 88 degs  4.    Pt will demonstrate right hip ER AROM to at least 20 degrees to maximize rehab outcomes. MET  Status at IE 14 degrees  Current: 30 degs R , 15 degs L  5.     Pt will demonstrate left hip flexion AROM to at least 80 degrees to return to PLOF. MET  Status at IE 50 degrees  Current: 110 degs      PLAN  Yes  Continue plan of care  [x]  Upgrade activities as tolerated  []  Discharge due to :  []  Other:    Christy Veras PTA    4/15/2025    12:47 PM    If an interpreting service was utilized for treatment of this patient, the contents of this document represent the material reviewed with the patient via the .    Future Appointments   Date Time Provider Department Center   4/21/2025 11:20 AM [facility] [address] [facility]   4/22/2025 12:40 PM Christy Veras, JIMMY [facility]   5/5/2025  2:20 PM Alberto Reed, PT [facility]   5/6/2025 11:00 AM Alberto Reed, PT [facility]   5/12/2025  1:40 PM Alberto Reed, PT [facility]   5/14/2025  2:20 PM Alberto Reed, PT [facility]   5/19/2025  2:20 PM Alberto Reed, PT [facility]   5/21/2025  3:20 PM Alberto Reed, PT [facility]   5/27/2025 11:00 AM Alberto Reed, PT [facility]   5/28/2025  1:00 PM Alberto Reed, PT [facility]

## 2025-04-21 ENCOUNTER — APPOINTMENT (OUTPATIENT)
Facility: HOSPITAL | Age: 70
End: 2025-04-21
Payer: MEDICARE

## 2025-04-22 ENCOUNTER — HOSPITAL ENCOUNTER (OUTPATIENT)
Facility: HOSPITAL | Age: 70
Setting detail: RECURRING SERIES
Discharge: HOME | End: 2025-04-25
Payer: MEDICARE

## 2025-04-22 PROCEDURE — 97110 THERAPEUTIC EXERCISES: CPT

## 2025-04-22 PROCEDURE — 97112 NEUROMUSCULAR REEDUCATION: CPT

## 2025-04-22 PROCEDURE — 97530 THERAPEUTIC ACTIVITIES: CPT

## 2025-04-22 NOTE — PROGRESS NOTES
PHYSICAL  DAILY TREATMENT NOTE     Patient Name: Carol Liao    Date: 2025    : 1955  Insurance: Payor: [facility] MEDICARE / Plan: [facility] MEDICARE ADVANTAGE / Product Type: *No Product type* /      Patient  verified Yes     Visit #   Current / Total 8 36 (12)   Time   In / Out 1240 136   Pain   In / Out 0/10 L/s 0/10   Subjective Functional Status/Changes:  Pt reports 75% improvement overall since SOC  Max p! 3/10 min p! 0/10       Next PN/ RC due PN 2025, RC 2025  Auth due 12  visits, 3/17/2025-10/9/2025    TREATMENT AREA =  Low back pain, unspecified  Other low back pain     OBJECTIVE      Modalities Rationale:     decrease pain to improve patient's ability to progress to PLOF and address remaining functional goals.    10 min  unbill []  Ice     [x]  Heat    location/position: [place] with wedge under B Les     Skin assessment post-treatment:   Intact         Therapeutic Procedures:    Tx Min Billable or 1:1 Min (if diff from Tx Min) Procedure, Rationale, Specifics   21  32110 Therapeutic Exercise (timed):  increase ROM, strength, coordination, balance, and proprioception to improve patient's ability to progress to PLOF and address remaining functional goals. (see flow sheet as applicable)     Details if applicable:       10  49023 Neuromuscular Re-Education (timed):  improve balance, coordination, kinesthetic sense, posture, core stability and proprioception to improve patient's ability to develop conscious control of individual muscles and awareness of position of extremities in order to progress to PLOF and address remaining functional goals. (see flow sheet as applicable)     Details if applicable:     15  93786 Therapeutic Activity (timed):  use of dynamic activities replicating functional movements to increase ROM, strength, coordination, balance, and proprioception in order to improve patient's ability to progress to PLOF and address remaining functional goals.  (see flow sheet as

## 2025-05-05 ENCOUNTER — HOSPITAL ENCOUNTER (OUTPATIENT)
Facility: HOSPITAL | Age: 70
Setting detail: RECURRING SERIES
Discharge: HOME | End: 2025-05-08
Payer: MEDICARE

## 2025-05-05 PROCEDURE — 97530 THERAPEUTIC ACTIVITIES: CPT

## 2025-05-05 PROCEDURE — 97110 THERAPEUTIC EXERCISES: CPT

## 2025-05-05 NOTE — PROGRESS NOTES
PHYSICAL / OCCUPATIONAL THERAPY - DAILY TREATMENT NOTE (updated )    Patient Name: Carol Liao    Date: 2025    : 1955  Insurance: Payor: [facility] MEDICARE / Plan: [facility] MEDICARE ADVANTAGE / Product Type: *No Product type* /      Patient  verified YES    Visit #   Current / Total 9 36   Time   In / Out 220 312   Pain   In / Out 3 4   Subjective Functional Status/Changes: Denies no changes since last session. Has been compliant with HEP     TREATMENT AREA =  Low back pain, unspecified  Other low back pain    Next PN/ RC due PN 2025, RC 2025  Auth due 12  visits, 3/17/2025-10/9/2025    OBJECTIVE    Modalities Rationale:     decrease pain to improve patient's ability to progress to PLOF and address remaining functional goals.     10 min  unbill []  Ice     [x]  Heat    left knee and L/S location/position: [place]owlers with wedge under B Les      Skin assessment post-treatment:   Intact      Therapeutic Procedures:  Tx Min Billable or 1:1 Min (if diff from Tx Min) Procedure, Rationale, Specifics     76091 Therapeutic Exercise (timed):  increase ROM, strength, coordination, balance, and proprioception to improve patient's ability to progress to PLOF and address remaining functional goals. (see flow sheet as applicable)     Details if applicable:    See flowsheet     10  14039 Therapeutic Activity (timed):  use of dynamic activities replicating functional movements to increase ROM, strength, coordination, balance, and proprioception in order to improve patient's ability to progress to PLOF and address remaining functional goals.  (see flow sheet as applicable)     Details if applicable:  mini squats, sit to stand, bridges  See flow sheet     42  [facility] Totals Reminder: bill using total billable min of TIMED therapeutic procedures (example: do not include dry needle or estim unattended, both untimed codes, in totals to left)  8-22 min = 1 unit; 23-37 min = 2 units; 38-52 min = 3 units; 53-67 min = 4

## 2025-05-06 ENCOUNTER — HOSPITAL ENCOUNTER (OUTPATIENT)
Facility: HOSPITAL | Age: 70
Setting detail: RECURRING SERIES
Discharge: HOME | End: 2025-05-09
Payer: MEDICARE

## 2025-05-06 PROCEDURE — 97110 THERAPEUTIC EXERCISES: CPT

## 2025-05-06 PROCEDURE — 97530 THERAPEUTIC ACTIVITIES: CPT

## 2025-05-06 NOTE — PROGRESS NOTES
PHYSICAL / OCCUPATIONAL THERAPY - DAILY TREATMENT NOTE (updated )    Patient Name: Carol Liao    Date: 2025    : 1955  Insurance: Payor: [facility] MEDICARE / Plan: [facility] MEDICARE ADVANTAGE / Product Type: *No Product type* /      Patient  verified YES    Visit #   Current / Total 10 36   Time   In / Out 1056 1148   Pain   In / Out 2 2   Subjective Functional Status/Changes: Reports feeling good after yesterday's appointment     TREATMENT AREA =  Low back pain, unspecified  Other low back pain    Next PN/ RC due 5/15/25  Auth due OSVALDO    OBJECTIVE    Heat (UNBILLED):  location/position: HOB Elevated, BLE elevated. L/S and Left Knee   Min Rationale   10 decrease pain and increase tissue extensibility to improve patient's ability to progress to PLOF and address remaining functional goals.     Skin assessment post-treatment:   Intact     Therapeutic Procedures:  Tx Min Billable or 1:1 Min (if diff from Tx Min) Procedure, Rationale, Specifics   34  92391 Therapeutic Exercise (timed):  increase ROM, strength, coordination, balance, and proprioception to improve patient's ability to progress to PLOF and address remaining functional goals. (see flow sheet as applicable)     Details if applicable:    See flowsheet     8  79552 Therapeutic Activity (timed):  use of dynamic activities replicating functional movements to increase ROM, strength, coordination, balance, and proprioception in order to improve patient's ability to progress to PLOF and address remaining functional goals.  (see flow sheet as applicable)     Details if applicable:   See flow sheet     42  [facility] Totals Reminder: bill using total billable min of TIMED therapeutic procedures (example: do not include dry needle or estim unattended, both untimed codes, in totals to left)  8-22 min = 1 unit; 23-37 min = 2 units; 38-52 min = 3 units; 53-67 min = 4 units; 68-82 min = 5 units   Total Total     Charge Capture    [x]  Patient Education billed

## 2025-05-12 ENCOUNTER — APPOINTMENT (OUTPATIENT)
Facility: HOSPITAL | Age: 70
End: 2025-05-12
Payer: MEDICARE

## 2025-05-14 ENCOUNTER — HOSPITAL ENCOUNTER (OUTPATIENT)
Facility: HOSPITAL | Age: 70
Setting detail: RECURRING SERIES
Discharge: HOME | End: 2025-05-17
Payer: MEDICARE

## 2025-05-14 PROCEDURE — 97110 THERAPEUTIC EXERCISES: CPT

## 2025-05-14 PROCEDURE — 97530 THERAPEUTIC ACTIVITIES: CPT

## 2025-05-14 NOTE — PROGRESS NOTES
PHYSICAL / OCCUPATIONAL THERAPY - DAILY TREATMENT NOTE (updated )    Patient Name: Carol Liao    Date: 2025    : 1955  Insurance: Payor: [facility] MEDICARE / Plan: [facility] MEDICARE ADVANTAGE / Product Type: *No Product type* /      Patient  verified YES    Visit #   Current / Total 11 36   Time   In / Out 220 309   Pain   In / Out 2 0   Subjective Functional Status/Changes: Pt requests to DC NV     TREATMENT AREA =  Low back pain, unspecified  Other low back pain    RC 2025  Auth due 12  visits, 3/17/2025-10/9/2025    OBJECTIVE    Modalities Rationale:     decrease pain to improve patient's ability to progress to PLOF and address remaining functional goals.                  min [] Estim Unattended, type/location:                                                 []  w/ice    []  w/heat     min [] Estim Attended, type/location:                                                []  w/US     []  w/ice    []  w/heat    []  TENS insruct       min []  Mechanical Traction: type/lbs                    []  pro   []  sup   []  int   []  cont    []  before manual    []  after manual     min []  Ultrasound, settings/location:      10 min  unbill []  Ice     [x]  Heat    location/position: L/S, supine with wedge under head and B LE     min []  Paraffin,  details:       min []  Vasopneumatic Device, press/temp:       min []  Whirlpool / Fluido:     If using vaso (only need to measure limb vaso being performed on)      pre-treatment girth :       post-treatment girth :       measured at (landmark location) :       min []  Other:     Skin assessment post-treatment:   Intact         Therapeutic Procedures:  Tx Min Billable or 1:1 Min (if diff from Tx Min) Procedure, Rationale, Specifics   29  69082 Therapeutic Exercise (timed):  increase ROM, strength, coordination, balance, and proprioception to improve patient's ability to progress to PLOF and address remaining functional goals. (see flow sheet as applicable)

## 2025-05-19 ENCOUNTER — HOSPITAL ENCOUNTER (OUTPATIENT)
Facility: HOSPITAL | Age: 70
Setting detail: RECURRING SERIES
Discharge: HOME | End: 2025-05-22
Payer: MEDICARE

## 2025-05-19 PROCEDURE — 97110 THERAPEUTIC EXERCISES: CPT

## 2025-05-19 PROCEDURE — 97530 THERAPEUTIC ACTIVITIES: CPT

## 2025-05-19 NOTE — PROGRESS NOTES
PHYSICAL / OCCUPATIONAL THERAPY - DAILY TREATMENT NOTE (updated )    Patient Name: Carol Liao    Date: 2025    : 1955  Insurance: Payor: [facility] MEDICARE / Plan: [facility] MEDICARE ADVANTAGE / Product Type: *No Product type* /      Patient  verified YES    Visit #   Current / Total 12 12   Time   In / Out 207 301   Pain   In / Out 0 0   Subjective Functional Status/Changes: See DC     TREATMENT AREA =  Low back pain, unspecified  Other low back pain    OBJECTIVE    Heat (UNBILLED):  location/position: L/S, HOB and BLE elevated .    Min Rationale   10 decrease pain and increase tissue extensibility to improve patient's ability to progress to PLOF and address remaining functional goals.     Skin assessment post-treatment:   Intact     Therapeutic Procedures:  Tx Min Billable or 1:1 Min (if diff from Tx Min) Procedure, Rationale, Specifics   30  19115 Therapeutic Exercise (timed):  increase ROM, strength, coordination, balance, and proprioception to improve patient's ability to progress to PLOF and address remaining functional goals. (see flow sheet as applicable)     Details if applicable:    See flowsheet     14  24836 Therapeutic Activity (timed):  use of dynamic activities replicating functional movements to increase ROM, strength, coordination, balance, and proprioception in order to improve patient's ability to progress to PLOF and address remaining functional goals.  (see flow sheet as applicable)     Details if applicable:    See flow sheet     44   [facility] Totals Reminder: bill using total billable min of TIMED therapeutic procedures (example: do not include dry needle or estim unattended, both untimed codes, in totals to left)  8-22 min = 1 unit; 23-37 min = 2 units; 38-52 min = 3 units; 53-67 min = 4 units; 68-82 min = 5 units   Total Total     Charge Capture    [x]  Patient Education billed concurrently with other procedures   [x] Review HEP    [] Progressed/Changed HEP, detail:    [] Other

## 2025-05-19 NOTE — PROGRESS NOTES
Physical Therapy Discharge Instructions      [facility]   [address] [address] [phone] fax      Patient: Carol Liao  : 1955      Continue Home Exercise Program 2 times per day for 4 weeks, then decrease to 1 times per week      Continue with    [] Ice  as needed 1-3 times per day     [x] Heat           Follow up with MD:     [] Upon completion of therapy     [] As needed      Recommendations:     [x]   Return to activity with home program    []   Return to activity with the following modifications:       []Post Rehab Program    []Join Independent aquatic program     []Return to/join local gym          Alberto Reed, PT 2025 2:32 PM

## 2025-05-19 NOTE — PROGRESS NOTES
[facility]  [address] [address] Phone: [phone] Fax    DISCHARGE SUMMARY  Patient Name: Carol Liao : 1955   Treatment/Medical Diagnosis: Low back pain, unspecified [M54.50]  Other low back pain [M54.59]   Referral Source: Jose Perez Do, DO     Date of Initial Visit: 3/17/25 Attended Visits: 12 Missed Visits: 2     SUMMARY OF TREATMENT  Patient's POC has consisted of therex, therapeutic activities, manual therapy prn, modalities prn, pt. education, and a comprehensive HEP. Treatment strategies used to address functional mobility deficits, ROM deficits, strength deficits, analyze and address soft tissue restrictions, analyze and cue movement patterns, analyze and modify body mechanics/ergonomics, assess and modify postural abnormalities and instruct in home and community integration.   CURRENT STATUS  Carol continues to progress towards goals in PT by increasing strength and improving functional mobility. Pt reports compliance with HEP and reports overall improvement since SOC. She has met a plateau in response to treatment and requests to be discharged today. She has met all but 1 goal which was in left knee flexion AROM. She is able to perform transfers and bed mobility without assistance. However still requires RW for ambulation.     5 Times Sit to Stand Test: 13 seconds  Oswestry: 16%  Right Shoulder Flexion AROM: 145 degrees  Left Knee Flexion AROM: 86 degs  Reported Standing Tolerance: 1 hour    Goal/Measure of Progress Goal Met?   1.  Pt will demonstrate 5 times sit to stand test of less than 15 seconds to indicate decreased falls risk    Status at last Eval: 19\" Current Status: 13\" MET   2.  Pt will demonstrate left knee flexion AROM to at least 90 degrees to improve gait training    Status at last Eval: 88 degs Current Status: 86 degs NOT MET   3.  Pt will be independent with HEP at D/C for self management    Status at last

## 2025-05-21 ENCOUNTER — APPOINTMENT (OUTPATIENT)
Facility: HOSPITAL | Age: 70
End: 2025-05-21
Payer: MEDICARE

## 2025-05-27 ENCOUNTER — APPOINTMENT (OUTPATIENT)
Facility: HOSPITAL | Age: 70
End: 2025-05-27
Payer: MEDICARE

## 2025-05-28 ENCOUNTER — APPOINTMENT (OUTPATIENT)
Facility: HOSPITAL | Age: 70
End: 2025-05-28
Payer: MEDICARE